# Patient Record
Sex: FEMALE | Race: WHITE | NOT HISPANIC OR LATINO | Employment: FULL TIME | URBAN - METROPOLITAN AREA
[De-identification: names, ages, dates, MRNs, and addresses within clinical notes are randomized per-mention and may not be internally consistent; named-entity substitution may affect disease eponyms.]

---

## 2017-06-22 ENCOUNTER — OFFICE VISIT (OUTPATIENT)
Dept: OBSTETRICS AND GYNECOLOGY | Facility: CLINIC | Age: 42
End: 2017-06-22
Payer: COMMERCIAL

## 2017-06-22 VITALS
SYSTOLIC BLOOD PRESSURE: 114 MMHG | WEIGHT: 185 LBS | BODY MASS INDEX: 34.04 KG/M2 | DIASTOLIC BLOOD PRESSURE: 70 MMHG | HEIGHT: 62 IN

## 2017-06-22 DIAGNOSIS — Z30.41 ENCOUNTER FOR SURVEILLANCE OF CONTRACEPTIVE PILLS: ICD-10-CM

## 2017-06-22 DIAGNOSIS — N92.6 IRREGULAR MENSES: ICD-10-CM

## 2017-06-22 DIAGNOSIS — Z01.419 ENCOUNTER FOR GYNECOLOGICAL EXAMINATION WITHOUT ABNORMAL FINDING: Primary | ICD-10-CM

## 2017-06-22 DIAGNOSIS — Z12.31 ENCOUNTER FOR SCREENING MAMMOGRAM FOR BREAST CANCER: ICD-10-CM

## 2017-06-22 PROCEDURE — 99386 PREV VISIT NEW AGE 40-64: CPT | Mod: S$GLB,,, | Performed by: NURSE PRACTITIONER

## 2017-06-22 PROCEDURE — 88175 CYTOPATH C/V AUTO FLUID REDO: CPT

## 2017-06-22 PROCEDURE — 99999 PR PBB SHADOW E&M-EST. PATIENT-LVL III: CPT | Mod: PBBFAC,,, | Performed by: NURSE PRACTITIONER

## 2017-06-22 PROCEDURE — 81025 URINE PREGNANCY TEST: CPT | Mod: QW,S$GLB,, | Performed by: NURSE PRACTITIONER

## 2017-06-22 RX ORDER — SUMATRIPTAN SUCCINATE 100 MG/1
TABLET ORAL
COMMUNITY
Start: 2016-04-13 | End: 2023-01-25

## 2017-06-22 RX ORDER — DESOGESTREL AND ETHINYL ESTRADIOL 21-5 (28)
1 KIT ORAL
COMMUNITY
End: 2017-06-22 | Stop reason: SDUPTHER

## 2017-06-22 RX ORDER — ALPRAZOLAM 0.5 MG/1
0.5 TABLET ORAL
COMMUNITY
Start: 2017-05-10

## 2017-06-22 RX ORDER — HYDROCODONE BITARTRATE AND ACETAMINOPHEN 5; 325 MG/1; MG/1
TABLET ORAL
COMMUNITY
Start: 2017-06-09

## 2017-06-22 RX ORDER — VERAPAMIL HYDROCHLORIDE 180 MG/1
180 TABLET, FILM COATED, EXTENDED RELEASE ORAL
COMMUNITY
Start: 2017-04-05

## 2017-06-22 RX ORDER — NAPROXEN 375 MG/1
375 TABLET ORAL
COMMUNITY
Start: 2017-06-12 | End: 2023-01-25

## 2017-06-22 RX ORDER — ZOLPIDEM TARTRATE 5 MG/1
5 TABLET ORAL
COMMUNITY
Start: 2017-05-10 | End: 2023-01-25

## 2017-06-22 RX ORDER — KETOROLAC TROMETHAMINE 10 MG/1
10 TABLET, FILM COATED ORAL
COMMUNITY
Start: 2017-05-12

## 2017-06-22 RX ORDER — FLUOXETINE HYDROCHLORIDE 20 MG/1
20 CAPSULE ORAL
COMMUNITY
Start: 2017-04-13

## 2017-06-22 RX ORDER — DESOGESTREL AND ETHINYL ESTRADIOL 21-5 (28)
1 KIT ORAL DAILY
Qty: 28 TABLET | Refills: 11 | Status: SHIPPED | OUTPATIENT
Start: 2017-06-22 | End: 2018-05-24 | Stop reason: SDUPTHER

## 2017-06-22 NOTE — PROGRESS NOTES
"CC: Well woman exam    Teri Hale is a 41 y.o. female  presents for well woman exam.  LMP: Patient's last menstrual period was 2017 (exact date)..    [facility] - follow up was just a repeat pap and was normal per pt.   She states she has had colpo in past and it too was normal.   currently on ocp and no cycle with ocp for this month.  UPT in office is negative today.  PT has skipped cycle in past on ocp but this is first time in some time for her.  UPT at home also negative.     Past Medical History:   Diagnosis Date    Anxiety and depression     Migraines      Past Surgical History:   Procedure Laterality Date     SECTION      x 1     Social History     Social History    Marital status:      Spouse name: N/A    Number of children: N/A    Years of education: N/A     Occupational History    Not on file.     Social History Main Topics    Smoking status: Never Smoker    Smokeless tobacco: Never Used    Alcohol use Yes      Comment: socially    Drug use: No    Sexual activity: Yes     Partners: Male     Birth control/ protection: Pill     Other Topics Concern    Not on file     Social History Narrative    No narrative on file     Family History   Problem Relation Age of Onset    Diabetes Father     Hypertension Father     Ovarian cancer Mother 57    Breast cancer Neg Hx     Cancer Neg Hx     Colon cancer Neg Hx     Eclampsia Neg Hx     Miscarriages / Stillbirths Neg Hx      labor Neg Hx     Stroke Neg Hx      OB History      Para Term  AB Living    3 1   1 2      SAB TAB Ectopic Multiple Live Births    2                  /70   Ht 5' 2" (1.575 m)   Wt 83.9 kg (185 lb)   LMP 2017 (Exact Date)   BMI 33.84 kg/m²       ROS:  GENERAL: Denies weight gain or weight loss. Feeling well overall.   SKIN: Denies rash or lesions.   HEAD: Denies head injury or headache.   NODES: Denies enlarged lymph nodes.   CHEST: Denies " chest pain or shortness of breath.   CARDIOVASCULAR: Denies palpitations or left sided chest pain.   ABDOMEN: No abdominal pain, constipation, diarrhea, nausea, vomiting or rectal bleeding.   URINARY: No frequency, dysuria, hematuria, or burning on urination.  REPRODUCTIVE: See HPI.   BREASTS: The patient performs breast self-examination and denies pain, lumps, or nipple discharge.   HEMATOLOGIC: No easy bruisability or excessive bleeding.   MUSCULOSKELETAL: Denies joint pain or swelling.   NEUROLOGIC: Denies syncope or weakness.   PSYCHIATRIC: Denies depression, anxiety or mood swings.    PHYSICAL EXAM:  APPEARANCE: Well nourished, well developed, in no acute distress.  AFFECT: WNL, alert and oriented x 3  SKIN: No acne or hirsutism  NECK: Neck symmetric without masses or thyromegaly  NODES: No inguinal, cervical, axillary, or femoral lymph node enlargement  CHEST: Good respiratory effect  ABDOMEN: Soft.  No tenderness or masses.  No hepatosplenomegaly.  No hernias.  BREASTS: Symmetrical, no skin changes or visible lesions.  No palpable masses, nipple discharge bilaterally.  PELVIC: Normal external genitalia without lesions.  Normal hair distribution.  Adequate perineal body, normal urethral meatus.  Vagina moist and well rugated without lesions or discharge.  Cervix pink, without lesions, discharge or tenderness.  No significant cystocele or rectocele.  Bimanual exam shows uterus to be normal size, regular, mobile and nontender.  Adnexa without masses or tenderness.    EXTREMITIES: No edema.  Physical Exam    1. Encounter for gynecological examination without abnormal finding  Liquid-based pap smear, screening   2. Irregular menses  POCT urine pregnancy   3. Encounter for screening mammogram for breast cancer  Mammo Digital Screening Bilat with CAD   4. Encounter for surveillance of contraceptive pills  desog-e.estradiol/e.estradiol (KARIVA) 0.15-0.02 mgx21 /0.01 mg x 5 per tablet    AND PLAN:    Patient was  counseled today on A.C.S. Pap guidelines and recommendations for yearly pelvic exams, mammograms and monthly self breast exams; to see her PCP for other health maintenance.     upt negative in office today - will refill ocp at this time

## 2017-06-27 ENCOUNTER — PATIENT MESSAGE (OUTPATIENT)
Dept: OBSTETRICS AND GYNECOLOGY | Facility: CLINIC | Age: 42
End: 2017-06-27

## 2018-05-24 DIAGNOSIS — Z30.41 ENCOUNTER FOR SURVEILLANCE OF CONTRACEPTIVE PILLS: ICD-10-CM

## 2018-05-25 RX ORDER — DESOGESTREL AND ETHINYL ESTRADIOL AND ETHINYL ESTRADIOL 21-5 (28)
KIT ORAL
Qty: 84 TABLET | Refills: 0 | Status: SHIPPED | OUTPATIENT
Start: 2018-05-25 | End: 2018-08-16 | Stop reason: SDUPTHER

## 2018-08-16 DIAGNOSIS — Z30.41 ENCOUNTER FOR SURVEILLANCE OF CONTRACEPTIVE PILLS: ICD-10-CM

## 2018-08-16 RX ORDER — DESOGESTREL AND ETHINYL ESTRADIOL AND ETHINYL ESTRADIOL 21-5 (28)
KIT ORAL
Qty: 28 TABLET | Refills: 0 | Status: SHIPPED | OUTPATIENT
Start: 2018-08-16 | End: 2018-08-16 | Stop reason: SDUPTHER

## 2018-08-17 RX ORDER — DESOGESTREL AND ETHINYL ESTRADIOL AND ETHINYL ESTRADIOL 21-5 (28)
KIT ORAL
Qty: 28 TABLET | Refills: 1 | Status: SHIPPED | OUTPATIENT
Start: 2018-08-17

## 2023-01-25 ENCOUNTER — OFFICE VISIT (OUTPATIENT)
Dept: OBSTETRICS AND GYNECOLOGY | Facility: CLINIC | Age: 48
End: 2023-01-25
Payer: COMMERCIAL

## 2023-01-25 ENCOUNTER — LAB VISIT (OUTPATIENT)
Dept: LAB | Facility: HOSPITAL | Age: 48
End: 2023-01-25
Payer: COMMERCIAL

## 2023-01-25 VITALS
SYSTOLIC BLOOD PRESSURE: 114 MMHG | DIASTOLIC BLOOD PRESSURE: 72 MMHG | WEIGHT: 231.25 LBS | HEIGHT: 62 IN | BODY MASS INDEX: 42.55 KG/M2

## 2023-01-25 DIAGNOSIS — N95.1 PERIMENOPAUSE: ICD-10-CM

## 2023-01-25 DIAGNOSIS — Z01.419 ENCOUNTER FOR CERVICAL PAP SMEAR WITH PELVIC EXAM: ICD-10-CM

## 2023-01-25 DIAGNOSIS — Z32.00 POSSIBLE PREGNANCY: ICD-10-CM

## 2023-01-25 DIAGNOSIS — Z12.31 ENCOUNTER FOR SCREENING MAMMOGRAM FOR BREAST CANCER: ICD-10-CM

## 2023-01-25 DIAGNOSIS — N92.6 IRREGULAR UTERINE BLEEDING: Primary | ICD-10-CM

## 2023-01-25 DIAGNOSIS — N92.6 IRREGULAR UTERINE BLEEDING: ICD-10-CM

## 2023-01-25 PROBLEM — G89.29 CHRONIC RIGHT-SIDED LOW BACK PAIN WITH SCIATICA: Status: ACTIVE | Noted: 2017-12-06

## 2023-01-25 PROBLEM — M54.40 CHRONIC RIGHT-SIDED LOW BACK PAIN WITH SCIATICA: Status: ACTIVE | Noted: 2017-12-06

## 2023-01-25 LAB
B-HCG UR QL: NEGATIVE
BASOPHILS # BLD AUTO: 0.06 K/UL (ref 0–0.2)
BASOPHILS NFR BLD: 0.6 % (ref 0–1.9)
CTP QC/QA: YES
DIFFERENTIAL METHOD: ABNORMAL
EOSINOPHIL # BLD AUTO: 0.6 K/UL (ref 0–0.5)
EOSINOPHIL NFR BLD: 6.4 % (ref 0–8)
ERYTHROCYTE [DISTWIDTH] IN BLOOD BY AUTOMATED COUNT: 14.2 % (ref 11.5–14.5)
FSH SERPL-ACNC: 65.71 MIU/ML
HCG INTACT+B SERPL-ACNC: <2.4 MIU/ML
HCT VFR BLD AUTO: 34.2 % (ref 37–48.5)
HGB BLD-MCNC: 10.2 G/DL (ref 12–16)
IMM GRANULOCYTES # BLD AUTO: 0.05 K/UL (ref 0–0.04)
IMM GRANULOCYTES NFR BLD AUTO: 0.5 % (ref 0–0.5)
LYMPHOCYTES # BLD AUTO: 2.5 K/UL (ref 1–4.8)
LYMPHOCYTES NFR BLD: 25.9 % (ref 18–48)
MCH RBC QN AUTO: 28.3 PG (ref 27–31)
MCHC RBC AUTO-ENTMCNC: 29.8 G/DL (ref 32–36)
MCV RBC AUTO: 95 FL (ref 82–98)
MONOCYTES # BLD AUTO: 0.7 K/UL (ref 0.3–1)
MONOCYTES NFR BLD: 7.5 % (ref 4–15)
NEUTROPHILS # BLD AUTO: 5.7 K/UL (ref 1.8–7.7)
NEUTROPHILS NFR BLD: 59.1 % (ref 38–73)
NRBC BLD-RTO: 0 /100 WBC
PLATELET # BLD AUTO: 433 K/UL (ref 150–450)
PMV BLD AUTO: 9.8 FL (ref 9.2–12.9)
PROLACTIN SERPL IA-MCNC: 13.3 NG/ML (ref 5.2–26.5)
RBC # BLD AUTO: 3.61 M/UL (ref 4–5.4)
WBC # BLD AUTO: 9.62 K/UL (ref 3.9–12.7)

## 2023-01-25 PROCEDURE — 88142 CYTOPATH C/V THIN LAYER: CPT

## 2023-01-25 PROCEDURE — 3074F PR MOST RECENT SYSTOLIC BLOOD PRESSURE < 130 MM HG: ICD-10-PCS | Mod: CPTII,S$GLB,,

## 2023-01-25 PROCEDURE — 3008F BODY MASS INDEX DOCD: CPT | Mod: CPTII,S$GLB,,

## 2023-01-25 PROCEDURE — 3074F SYST BP LT 130 MM HG: CPT | Mod: CPTII,S$GLB,,

## 2023-01-25 PROCEDURE — 84146 ASSAY OF PROLACTIN: CPT

## 2023-01-25 PROCEDURE — 81025 POCT URINE PREGNANCY: ICD-10-PCS | Mod: S$GLB,,,

## 2023-01-25 PROCEDURE — 87591 N.GONORRHOEAE DNA AMP PROB: CPT

## 2023-01-25 PROCEDURE — 83520 IMMUNOASSAY QUANT NOS NONAB: CPT

## 2023-01-25 PROCEDURE — 84702 CHORIONIC GONADOTROPIN TEST: CPT

## 2023-01-25 PROCEDURE — 3008F PR BODY MASS INDEX (BMI) DOCUMENTED: ICD-10-PCS | Mod: CPTII,S$GLB,,

## 2023-01-25 PROCEDURE — 3078F PR MOST RECENT DIASTOLIC BLOOD PRESSURE < 80 MM HG: ICD-10-PCS | Mod: CPTII,S$GLB,,

## 2023-01-25 PROCEDURE — 87624 HPV HI-RISK TYP POOLED RSLT: CPT

## 2023-01-25 PROCEDURE — 81514 NFCT DS BV&VAGINITIS DNA ALG: CPT

## 2023-01-25 PROCEDURE — 99999 PR PBB SHADOW E&M-NEW PATIENT-LVL IV: CPT | Mod: PBBFAC,,,

## 2023-01-25 PROCEDURE — 99386 PR PREVENTIVE VISIT,NEW,40-64: ICD-10-PCS | Mod: S$GLB,,,

## 2023-01-25 PROCEDURE — 99999 PR PBB SHADOW E&M-NEW PATIENT-LVL IV: ICD-10-PCS | Mod: PBBFAC,,,

## 2023-01-25 PROCEDURE — 83001 ASSAY OF GONADOTROPIN (FSH): CPT

## 2023-01-25 PROCEDURE — 85025 COMPLETE CBC W/AUTO DIFF WBC: CPT

## 2023-01-25 PROCEDURE — 81025 URINE PREGNANCY TEST: CPT | Mod: S$GLB,,,

## 2023-01-25 PROCEDURE — 1159F MED LIST DOCD IN RCRD: CPT | Mod: CPTII,S$GLB,,

## 2023-01-25 PROCEDURE — 99386 PREV VISIT NEW AGE 40-64: CPT | Mod: S$GLB,,,

## 2023-01-25 PROCEDURE — 3078F DIAST BP <80 MM HG: CPT | Mod: CPTII,S$GLB,,

## 2023-01-25 PROCEDURE — 1159F PR MEDICATION LIST DOCUMENTED IN MEDICAL RECORD: ICD-10-PCS | Mod: CPTII,S$GLB,,

## 2023-01-25 RX ORDER — TRAZODONE HYDROCHLORIDE 50 MG/1
50 TABLET ORAL
COMMUNITY
Start: 2023-01-13

## 2023-01-25 RX ORDER — METHOCARBAMOL 750 MG/1
750 TABLET, FILM COATED ORAL 2 TIMES DAILY PRN
COMMUNITY
Start: 2022-08-31

## 2023-01-25 RX ORDER — GABAPENTIN 300 MG/1
300 CAPSULE ORAL 2 TIMES DAILY
COMMUNITY
Start: 2023-01-13

## 2023-01-25 RX ORDER — BUTALBITAL, ACETAMINOPHEN AND CAFFEINE 50; 325; 40 MG/1; MG/1; MG/1
1 TABLET ORAL EVERY 6 HOURS PRN
COMMUNITY
Start: 2022-12-07

## 2023-01-25 RX ORDER — ARIPIPRAZOLE 5 MG/1
5 TABLET ORAL
COMMUNITY
Start: 2022-12-29

## 2023-01-25 NOTE — PROGRESS NOTES
"  Subjective:       Patient ID: Teri Hale is a 47 y.o. female.    Chief Complaint:  Vaginal Bleeding      History of Present Illness  Vaginal Bleeding  The patient's pertinent negatives include no pelvic pain or vaginal discharge. Pertinent negatives include no chills, fever, frequency or urgency.   Dysfunctional Uterine Bleeding  Patient complains of irregular menses. Pt states she started her period on 22, ~25 days ago. Pt states that prior to this she did not have a cycle for 6 months. She changes her pad every  3-4  hours. Clots are quarter size. Dysmenorrhea:moderate, occurring throughout menses. Cyclic symptoms include: none. Current contraception: none. History of infertility: unknown. History of abnormal Pap smear: no.    Pt not interested in hormonal intervention at this time. Pt states that she has been bleeding daily for the past 25 days, average volume of a period. States today and yesterday it has slowed down to spotting.     Last pap smear was in 2017, wnl. Last MMG was "years ago."    GYN & OB History  Patient's last menstrual period was 2022.   Date of Last Pap: 2023    OB History    Para Term  AB Living   3 1   1 2     SAB IAB Ectopic Multiple Live Births   2              # Outcome Date GA Lbr Rich/2nd Weight Sex Delivery Anes PTL Lv   3 SAB            2       CS-Unspec      1 SAB                Review of Systems  Review of Systems   Constitutional:  Negative for chills, fatigue and fever.   Genitourinary:  Positive for vaginal bleeding. Negative for dysmenorrhea, frequency, pelvic pain, urgency, vaginal discharge, vaginal pain, urinary incontinence and vaginal odor.   All other systems reviewed and are negative.        Objective:    Physical Exam:   Constitutional: She is oriented to person, place, and time. She appears well-developed and well-nourished. No distress.    HENT:   Head: Normocephalic and atraumatic.    Eyes: Pupils are equal, round, and reactive " to light. Conjunctivae and EOM are normal.     Cardiovascular:  Normal rate.             Pulmonary/Chest: Effort normal.        Abdominal: Soft. She exhibits no distension. There is no abdominal tenderness. There is no rebound and no guarding. Hernia confirmed negative in the right inguinal area and confirmed negative in the left inguinal area.     Genitourinary:    Inguinal canal, uterus, right adnexa and left adnexa normal.   Rectum:      No external hemorrhoid.      Pelvic exam was performed with patient supine.   The external female genitalia was normal.   No external genitalia lesions identified,Genitalia hair distrobution normal .   Labial bartholins normal.There is no rash, tenderness, lesion or injury on the right labia. There is no rash, tenderness, lesion or injury on the left labia. Cervix is normal. Right adnexum displays no mass, no tenderness and no fullness. Left adnexum displays no mass, no tenderness and no fullness. There is bleeding (scant, brown, old blood) in the vagina. No erythema,  no vaginal discharge or tenderness in the vagina.    No foreign body in the vagina.      No signs of injury in the vagina.   Vagina was moist.Cervix exhibits no motion tenderness, no lesion, no friability, no lesion, no tenderness and no polyp.    pap smear completedUerus contour normal  Uterus is not enlarged and not tender. Normal urethral meatus.Bladder findings: no bladder distention and no bladder tenderness          Musculoskeletal: Normal range of motion and moves all extremeties.      Lymphadenopathy: No inguinal adenopathy noted on the right or left side.    Neurological: She is alert and oriented to person, place, and time.    Skin: Skin is warm and dry. No rash noted. She is not diaphoretic. No erythema. No pallor.    Psychiatric: She has a normal mood and affect. Her behavior is normal. Judgment and thought content normal.        Assessment:        1. Irregular uterine bleeding    2. Encounter for  cervical Pap smear with pelvic exam    3. Encounter for screening mammogram for breast cancer    4. Possible pregnancy    5. Perimenopause                Plan:      Irregular uterine bleeding  -     CBC W/ AUTO DIFFERENTIAL; Future; Expected date: 01/25/2023  -     HCG, QUANTITATIVE, PREGNANCY; Future; Expected date: 01/25/2023  -     US Pelvis Comp with Transvag NON-OB (xpd; Future; Expected date: 01/25/2023  -     PROLACTIN; Future; Expected date: 01/25/2023  -     FOLLICLE STIMULATING HORMONE; Future; Expected date: 01/25/2023  -     ANTIMULLERIAN HORMONE ([facility]); Future; Expected date: 01/25/2023  -     C. trachomatis/N. gonorrhoeae by AMP DNA [facility]; Cervicovaginal  -     VAGINOSIS SCREEN BY DNA PROBE    Encounter for cervical Pap smear with pelvic exam  -     Liquid-Based Pap Smear, Screening  -     HPV High Risk Genotypes, PCR    Encounter for screening mammogram for breast cancer  -     Mammo Digital Screening Bilat w/ Macario; Future; Expected date: 01/25/2023    Possible pregnancy  -     POCT Urine Pregnancy    Perimenopause        Follow up pending results.

## 2023-01-27 ENCOUNTER — PATIENT MESSAGE (OUTPATIENT)
Dept: OBSTETRICS AND GYNECOLOGY | Facility: CLINIC | Age: 48
End: 2023-01-27
Payer: COMMERCIAL

## 2023-01-27 LAB
BACTERIAL VAGINOSIS DNA: NEGATIVE
C TRACH DNA SPEC QL NAA+PROBE: NOT DETECTED
CANDIDA GLABRATA DNA: NEGATIVE
CANDIDA KRUSEI DNA: NEGATIVE
CANDIDA RRNA VAG QL PROBE: NEGATIVE
MIS SERPL-MCNC: <0.03 NG/ML
N GONORRHOEA DNA SPEC QL NAA+PROBE: NOT DETECTED
T VAGINALIS RRNA GENITAL QL PROBE: NEGATIVE

## 2023-02-01 LAB
HPV HR 12 DNA SPEC QL NAA+PROBE: NEGATIVE
HPV16 AG SPEC QL: NEGATIVE
HPV18 DNA SPEC QL NAA+PROBE: NEGATIVE

## 2023-02-03 LAB
FINAL PATHOLOGIC DIAGNOSIS: NORMAL
Lab: NORMAL

## 2025-07-28 ENCOUNTER — LAB VISIT (OUTPATIENT)
Dept: LAB | Facility: HOSPITAL | Age: 50
End: 2025-07-28
Payer: COMMERCIAL

## 2025-07-28 ENCOUNTER — OFFICE VISIT (OUTPATIENT)
Dept: OBSTETRICS AND GYNECOLOGY | Facility: CLINIC | Age: 50
End: 2025-07-28
Payer: COMMERCIAL

## 2025-07-28 VITALS
HEIGHT: 62 IN | SYSTOLIC BLOOD PRESSURE: 98 MMHG | DIASTOLIC BLOOD PRESSURE: 72 MMHG | WEIGHT: 203.25 LBS | BODY MASS INDEX: 37.4 KG/M2

## 2025-07-28 DIAGNOSIS — Z12.4 CERVICAL CANCER SCREENING: ICD-10-CM

## 2025-07-28 DIAGNOSIS — N95.0 PMB (POSTMENOPAUSAL BLEEDING): ICD-10-CM

## 2025-07-28 DIAGNOSIS — N95.0 PMB (POSTMENOPAUSAL BLEEDING): Primary | ICD-10-CM

## 2025-07-28 LAB
ABSOLUTE EOSINOPHIL (OHS): 0.05 K/UL
ABSOLUTE MONOCYTE (OHS): 0.39 K/UL (ref 0.3–1)
ABSOLUTE NEUTROPHIL COUNT (OHS): 13.91 K/UL (ref 1.8–7.7)
BASOPHILS # BLD AUTO: 0.05 K/UL
BASOPHILS NFR BLD AUTO: 0.3 %
ERYTHROCYTE [DISTWIDTH] IN BLOOD BY AUTOMATED COUNT: 12.6 % (ref 11.5–14.5)
FSH SERPL-ACNC: 148.46 MIU/ML
HCT VFR BLD AUTO: 34.3 % (ref 37–48.5)
HGB BLD-MCNC: 10.7 GM/DL (ref 12–16)
IMM GRANULOCYTES # BLD AUTO: 0.14 K/UL (ref 0–0.04)
IMM GRANULOCYTES NFR BLD AUTO: 0.9 % (ref 0–0.5)
LYMPHOCYTES # BLD AUTO: 1.3 K/UL (ref 1–4.8)
MCH RBC QN AUTO: 29.6 PG (ref 27–31)
MCHC RBC AUTO-ENTMCNC: 31.2 G/DL (ref 32–36)
MCV RBC AUTO: 95 FL (ref 82–98)
NUCLEATED RBC (/100WBC) (OHS): 0 /100 WBC
PLATELET # BLD AUTO: 343 K/UL (ref 150–450)
PMV BLD AUTO: 9.9 FL (ref 9.2–12.9)
RBC # BLD AUTO: 3.61 M/UL (ref 4–5.4)
RELATIVE EOSINOPHIL (OHS): 0.3 %
RELATIVE LYMPHOCYTE (OHS): 8.2 % (ref 18–48)
RELATIVE MONOCYTE (OHS): 2.5 % (ref 4–15)
RELATIVE NEUTROPHIL (OHS): 87.8 % (ref 38–73)
TSH SERPL-ACNC: 0.51 UIU/ML (ref 0.4–4)
WBC # BLD AUTO: 15.84 K/UL (ref 3.9–12.7)

## 2025-07-28 PROCEDURE — 88305 TISSUE EXAM BY PATHOLOGIST: CPT | Mod: TC

## 2025-07-28 PROCEDURE — 3078F DIAST BP <80 MM HG: CPT | Mod: CPTII,S$GLB,,

## 2025-07-28 PROCEDURE — 85025 COMPLETE CBC W/AUTO DIFF WBC: CPT

## 2025-07-28 PROCEDURE — 83001 ASSAY OF GONADOTROPIN (FSH): CPT

## 2025-07-28 PROCEDURE — 3008F BODY MASS INDEX DOCD: CPT | Mod: CPTII,S$GLB,,

## 2025-07-28 PROCEDURE — 99999 PR PBB SHADOW E&M-EST. PATIENT-LVL III: CPT | Mod: PBBFAC,,,

## 2025-07-28 PROCEDURE — 58100 BIOPSY OF UTERUS LINING: CPT | Mod: S$GLB,,,

## 2025-07-28 PROCEDURE — 3074F SYST BP LT 130 MM HG: CPT | Mod: CPTII,S$GLB,,

## 2025-07-28 PROCEDURE — 1159F MED LIST DOCD IN RCRD: CPT | Mod: CPTII,S$GLB,,

## 2025-07-28 PROCEDURE — 36415 COLL VENOUS BLD VENIPUNCTURE: CPT

## 2025-07-28 PROCEDURE — 99213 OFFICE O/P EST LOW 20 MIN: CPT | Mod: 25,S$GLB,,

## 2025-07-28 PROCEDURE — 84443 ASSAY THYROID STIM HORMONE: CPT

## 2025-07-28 PROCEDURE — 4010F ACE/ARB THERAPY RXD/TAKEN: CPT | Mod: CPTII,S$GLB,,

## 2025-07-28 PROCEDURE — 1160F RVW MEDS BY RX/DR IN RCRD: CPT | Mod: CPTII,S$GLB,,

## 2025-07-28 RX ORDER — LISINOPRIL AND HYDROCHLOROTHIAZIDE 10; 12.5 MG/1; MG/1
TABLET ORAL
COMMUNITY
Start: 2025-02-14

## 2025-07-28 RX ORDER — CARIPRAZINE 1.5 MG/1
1.5 CAPSULE, GELATIN COATED ORAL
COMMUNITY
Start: 2025-07-05

## 2025-07-28 RX ORDER — BUSPIRONE HYDROCHLORIDE 5 MG/1
TABLET ORAL
COMMUNITY
Start: 2025-02-14

## 2025-07-28 RX ORDER — HYDROXYZINE PAMOATE 25 MG/1
25 CAPSULE ORAL 2 TIMES DAILY
COMMUNITY
Start: 2025-06-26

## 2025-07-28 RX ORDER — METOPROLOL TARTRATE 25 MG/1
TABLET, FILM COATED ORAL
COMMUNITY
Start: 2025-02-14

## 2025-07-28 RX ORDER — ROSUVASTATIN CALCIUM 5 MG/1
5 TABLET, COATED ORAL
COMMUNITY
Start: 2025-06-03

## 2025-07-28 RX ORDER — NAPROXEN 500 MG/1
500 TABLET ORAL 2 TIMES DAILY
COMMUNITY
Start: 2025-07-25

## 2025-07-28 RX ORDER — PANTOPRAZOLE SODIUM 20 MG/1
20 TABLET, DELAYED RELEASE ORAL
COMMUNITY
Start: 2025-03-31

## 2025-07-28 RX ORDER — TIRZEPATIDE 7.5 MG/.5ML
INJECTION, SOLUTION SUBCUTANEOUS
COMMUNITY
Start: 2025-07-16

## 2025-07-28 RX ORDER — DEXTROAMPHETAMINE SULFATE, DEXTROAMPHETAMINE SACCHARATE, AMPHETAMINE SULFATE AND AMPHETAMINE ASPARTATE 7.5; 7.5; 7.5; 7.5 MG/1; MG/1; MG/1; MG/1
CAPSULE, EXTENDED RELEASE ORAL
COMMUNITY
Start: 2025-05-16

## 2025-07-28 NOTE — PROCEDURES
Endometrial biopsy    Date/Time: 7/28/2025 10:45 AM    Performed by: Karolina Reynolds NP  Authorized by: Karolina Reynolds NP    Consent:     Prior to procedure the appropriate consent was completed and verified      Consent given by:  Patient    Patient questions answered: yes      Patient agrees, verbalizes understanding, and wants to proceed: yes      Educational handouts given: no      Instructions and paperwork completed: no    Indication:     Indications: Post-menopausal bleeding      Chronicity of post-menopausal bleeding:  New    Progression of post-menopausal bleeding:  Resolved  Pre-procedure:     Pre-procedure timeout performed: yes    Procedure:     Procedure: endometrial biopsy with Pipelle      Cervix cleaned and prepped: no      A paracervical block was performed: no      An intracervical block was performed: no      The cervix was dilated using cervical dilators: no      Use of single-tooth tenaculum: yes      Uterus sounded: yes      Uterus sound depth (cm):  8    Specimen collected: specimen collected and sent to pathology      Patient tolerated procedure well with no complications: yes    Comments:     Procedure comments:  Patient counseled to manage post insertion cramping and pain with tylenol and NSAIDS   Bleeding precautions discussed   Will follow results          Karolina Reynolds NP

## 2025-07-28 NOTE — PROGRESS NOTES
Subjective:       Patient ID: Teri Hale is a 49 y.o. female.    Chief Complaint:  Vaginal Bleeding      History of Present Illness  HPI  Postmenopausal Bleeding  Patient complains of vaginal bleeding. She has been menopausal for 2 years. Currently on no HRT and has never been. Bleeding is described as spotting and has occurred 1 time, 2 weeks ago. Lasted about 3 days, only requiring a panty liner. Mild cramping   Workup to date: none.  No intercourse in over a year.     On Mounjaro x1 year, last dose increase about 4 months   She has lost about 36 lbs     Mom with ovarian cancer and sister recently finished treatment for breast cancer     Menstrual History:  OB History          3    Para   1    Term           1    AB   2    Living             SAB   2    IAB        Ectopic        Multiple        Live Births                    Menarche age: 14  Patient's last menstrual period was 2022.       GYN & OB History  Patient's last menstrual period was 2022.   Date of Last Pap: 2/3/2023    OB History    Para Term  AB Living   3 1  1 2    SAB IAB Ectopic Multiple Live Births   2          # Outcome Date GA Lbr Rich/2nd Weight Sex Type Anes PTL Lv   3 SAB            2       CS-Unspec      1 SAB                Review of Systems  Review of Systems   Constitutional: Negative.    HENT: Negative.     Eyes: Negative.    Respiratory: Negative.     Cardiovascular: Negative.    Gastrointestinal: Negative.    Genitourinary: Negative.  Positive for postmenopausal bleeding.   Musculoskeletal: Negative.    Integumentary:  Negative.   Neurological: Negative.    Hematological: Negative.    Psychiatric/Behavioral: Negative.     All other systems reviewed and are negative.  Breast: negative.            Objective:      Physical Exam:   Constitutional: She is oriented to person, place, and time. She appears well-developed and well-nourished. No distress.    HENT:   Head: Normocephalic and  atraumatic.    Eyes: Pupils are equal, round, and reactive to light. Conjunctivae and EOM are normal.     Cardiovascular:  Normal rate.             Pulmonary/Chest: Effort normal.        Abdominal: Soft. She exhibits no distension. There is no abdominal tenderness. There is no rebound and no guarding. Hernia confirmed negative in the right inguinal area and confirmed negative in the left inguinal area.     Genitourinary:    Inguinal canal, vagina, uterus, right adnexa and left adnexa normal.   Rectum:      No external hemorrhoid.      Pelvic exam was performed with patient supine.   The external female genitalia was normal.   No external genitalia lesions identified,Genitalia hair distrobution normal .     Labial bartholins normal.There is no rash, tenderness, lesion or injury on the right labia. There is no rash, tenderness, lesion or injury on the left labia. Cervix is normal. Right adnexum displays no mass, no tenderness and no fullness. Left adnexum displays no mass, no tenderness and no fullness. No erythema, vaginal discharge, tenderness or bleeding in the vagina.    No foreign body in the vagina.      No signs of injury in the vagina.   Vagina was moist.Cervix exhibits no motion tenderness, no lesion, no friability, no tenderness and no polyp.    pap smear completedUerus contour normal  Uterus is not enlarged and not tender. Uterus size: 8 cm.Normal urethral meatus.Urethral Meatus exhibits: no urethral lesionUrethra findings: no urethral mass, no tenderness, no urethral scarring and no prolapsedBladder findings: no bladder distention and no bladder tenderness          Musculoskeletal: Normal range of motion and moves all extremeties.      Lymphadenopathy: No inguinal adenopathy noted on the right or left side.    Neurological: She is alert and oriented to person, place, and time.    Skin: Skin is warm and dry. No rash noted. She is not diaphoretic. No erythema. No pallor.    Psychiatric: She has a normal mood  and affect. Her behavior is normal. Judgment and thought content normal.             Assessment:        1. PMB (postmenopausal bleeding)    2. Cervical cancer screening               Plan:   Postmenopausal bleeding  Possible etiologies include atrophy, intrauterine lesions such as fibroids/endometrial polyps, endometrial hyperplasia, or malignancy  Will obtain TVUS to assess for intracavitary lesions.   If endometrial stripe is less than or equal to 4mm, this has a negative predictive value of greater than 99% for endometrial cancer. If bleeding is recurrent or persistent, endometrial biopsy should be performed  If endometrial stripe is greater than 4mm, then recommend endometrial biopsy  Endometrial biopsy accepted today  If biopsy results benign, then can consider expectant management  Medical management if bleeding persists including daily Provera 10 mg or Liletta IUD insertion  In addition, if bleeding persists, consider surgical management options include D&C in the OR for better endometrial sampling or hysterectomy for definitive treatment  Consider other sources of bleeding such as GI, , cervical, vaginal, vulvar    Diagnosis and orders this visit:  PMB (postmenopausal bleeding)  -     CBC Auto Differential; Future; Expected date: 07/28/2025  -     Follicle Stimulating Hormone; Future; Expected date: 07/28/2025  -     TSH; Future; Expected date: 07/28/2025  -     US Pelvis Comp with Transvag NON-OB (xpd; Future; Expected date: 07/28/2025  -     Endometrial biopsy  -     Specimen to Pathology [facility]    Cervical cancer screening  -     Liquid-Based Pap Smear, Screening         Karolina Reynolds NP

## 2025-07-30 ENCOUNTER — TELEPHONE (OUTPATIENT)
Dept: OBSTETRICS AND GYNECOLOGY | Facility: CLINIC | Age: 50
End: 2025-07-30
Payer: COMMERCIAL

## 2025-07-30 LAB
ESTROGEN SERPL-MCNC: NORMAL PG/ML
INSULIN SERPL-ACNC: NORMAL U[IU]/ML
LAB AP CLINICAL INFORMATION: NORMAL
LAB AP GROSS DESCRIPTION: NORMAL
LAB AP PERFORMING LOCATION(S): NORMAL
LAB AP REPORT FOOTNOTES: NORMAL

## 2025-07-30 NOTE — TELEPHONE ENCOUNTER
Copied from CRM #[id number]. Topic: General Inquiry - Patient Advice  >> Jul 30, 2025 10:50 AM Tia wrote:  Who Called: Pt     What is the request in detail: Requesting call back to discuss follow up, pt needs to speak about results. Please advise    Can the clinic reply by [facility]? No    Best Call Back Number: [phone]      Additional Information:

## 2025-07-30 NOTE — TELEPHONE ENCOUNTER
Pt calling in regards to Biopsy results done 7/28.  Advised pt once results are reviewed a [employer] message will be sent . Pt VU  Message sent to provider     Bonnie KONG LPN  OB/GYN

## 2025-07-31 LAB
INSULIN SERPL-ACNC: NORMAL U[IU]/ML
LAB AP BETHESDA CATEGORY: NORMAL
LAB AP CLINICAL FINDINGS: NORMAL
LAB AP CONTRACEPTIVES: NORMAL
LAB AP LMP DATE: NORMAL
LAB AP OCHS PAP SPECIMEN ADEQUACY: NORMAL
LAB AP OHS PAP INTERPRETATION: NORMAL
LAB AP PAP DISCLAIMER COMMENTS: NORMAL
LAB AP PAP ESTROGEN REPLACEMENT THERAPY: NORMAL
LAB AP PAP PMP: NORMAL
LAB AP PAP PREVIOUS BX: NORMAL
LAB AP PAP PRIOR TREATMENT: NORMAL
LAB AP PERFORMING LOCATION(S): NORMAL